# Patient Record
Sex: MALE | Race: OTHER | Employment: FULL TIME | URBAN - METROPOLITAN AREA
[De-identification: names, ages, dates, MRNs, and addresses within clinical notes are randomized per-mention and may not be internally consistent; named-entity substitution may affect disease eponyms.]

---

## 2018-05-02 ENCOUNTER — HOSPITAL ENCOUNTER (OUTPATIENT)
Age: 33
Discharge: HOME | End: 2018-05-02
Attending: FAMILY MEDICINE
Payer: COMMERCIAL

## 2018-05-02 VITALS
TEMPERATURE: 98 F | WEIGHT: 175 LBS | DIASTOLIC BLOOD PRESSURE: 61 MMHG | SYSTOLIC BLOOD PRESSURE: 130 MMHG | HEART RATE: 73 BPM | OXYGEN SATURATION: 99 % | RESPIRATION RATE: 20 BRPM

## 2018-05-02 DIAGNOSIS — S05.8X1A ABRASION OF RIGHT EYE, INITIAL ENCOUNTER: Primary | ICD-10-CM

## 2018-05-02 PROCEDURE — 99203 OFFICE O/P NEW LOW 30 MIN: CPT

## 2018-05-02 NOTE — ED INITIAL ASSESSMENT (HPI)
Pt here with complaints of right eye pain, pt states he felt something flew in his eye yesterday when he was outside, pt states he irrigated his eye but still feels he has something in his eye, no drainage noted

## 2018-05-02 NOTE — ED PROVIDER NOTES
Patient Seen in: [address]    History   Patient presents with:  Foreign Body in [place]    Stated Complaint: foregin object in eye    HPI  51-year-old male presents with concern for foreign body in his right eye.   Said the [id number]  ------------------------------------------------------------  Foreign body seen on exam however patient does have a corneal abrasion over the iris at the 9 o'clock position, however, no foreign body seen which is his main concern.   Dr. Jimy Najera of

## 2020-01-23 ENCOUNTER — HOSPITAL ENCOUNTER (EMERGENCY)
Age: 35
Discharge: HOME | End: 2020-01-23
Attending: EMERGENCY MEDICINE
Payer: COMMERCIAL

## 2020-01-23 VITALS
BODY MASS INDEX: 30.13 KG/M2 | RESPIRATION RATE: 16 BRPM | HEART RATE: 77 BPM | SYSTOLIC BLOOD PRESSURE: 139 MMHG | WEIGHT: 192 LBS | OXYGEN SATURATION: 100 % | TEMPERATURE: 98 F | HEIGHT: 67 IN | DIASTOLIC BLOOD PRESSURE: 74 MMHG

## 2020-01-23 DIAGNOSIS — T15.01XA FOREIGN BODY OF RIGHT CORNEA, INITIAL ENCOUNTER: Primary | ICD-10-CM

## 2020-01-23 PROCEDURE — 90471 IMMUNIZATION ADMIN: CPT

## 2020-01-23 PROCEDURE — 99283 EMERGENCY DEPT VISIT LOW MDM: CPT

## 2020-01-23 RX ORDER — TETRACAINE HYDROCHLORIDE 5 MG/ML
1 SOLUTION OPHTHALMIC ONCE
Status: COMPLETED | OUTPATIENT
Start: 2020-01-23 | End: 2020-01-23

## 2020-01-23 NOTE — ED PROVIDER NOTES
Patient Seen in: [facility] In [place]      History   Patient presents with:  Eye Problem    Stated Complaint: right eye FB     HPI    Patient is a pleasant 42-year-old male, presenting for evaluation of a foreign body to his right ey Pulmonary:      Effort: Pulmonary effort is normal. No respiratory distress. Abdominal:      General: Abdomen is flat. Musculoskeletal: Normal range of motion. Skin:     General: Skin is warm and dry.    Neurological:      General: No focal deficit pr

## (undated) NOTE — ED AVS SNAPSHOT
Clint Briones   MRN: [id number]    Department:  [facility] Emergency Department in [place]   Date of Visit:  1/23/2020           Disclosure     Insurance plans vary and the physician(s) referred by the ER may not be covered by your plan.  Please contact y tell this physician (or your personal doctor if your instructions are to return to your personal doctor) about any new or lasting problems. The primary care or specialist physician will see patients referred from the [facility] Emergency Department.  Gaurang Cage